# Patient Record
Sex: MALE | Race: BLACK OR AFRICAN AMERICAN | NOT HISPANIC OR LATINO | URBAN - METROPOLITAN AREA
[De-identification: names, ages, dates, MRNs, and addresses within clinical notes are randomized per-mention and may not be internally consistent; named-entity substitution may affect disease eponyms.]

---

## 2021-01-07 ENCOUNTER — WEB ENCOUNTER (OUTPATIENT)
Dept: URBAN - METROPOLITAN AREA CLINIC 115 | Facility: CLINIC | Age: 26
End: 2021-01-07

## 2021-01-07 ENCOUNTER — LAB OUTSIDE AN ENCOUNTER (OUTPATIENT)
Dept: URBAN - METROPOLITAN AREA CLINIC 115 | Facility: CLINIC | Age: 26
End: 2021-01-07

## 2021-01-07 ENCOUNTER — OFFICE VISIT (OUTPATIENT)
Dept: URBAN - METROPOLITAN AREA CLINIC 115 | Facility: CLINIC | Age: 26
End: 2021-01-07
Payer: COMMERCIAL

## 2021-01-07 DIAGNOSIS — K59.00 COLONIC CONSTIPATION: ICD-10-CM

## 2021-01-07 DIAGNOSIS — R14.3 GAS AND BLOATING: ICD-10-CM

## 2021-01-07 DIAGNOSIS — K92.1 RECTAL BLEEDING: ICD-10-CM

## 2021-01-07 DIAGNOSIS — R10.32 LEFT LOWER QUADRANT ABDOMINAL PAIN: ICD-10-CM

## 2021-01-07 PROCEDURE — 99214 OFFICE O/P EST MOD 30 MIN: CPT | Performed by: INTERNAL MEDICINE

## 2021-01-07 PROCEDURE — G9903 PT SCRN TBCO ID AS NON USER: HCPCS | Performed by: INTERNAL MEDICINE

## 2021-01-07 PROCEDURE — G8427 DOCREV CUR MEDS BY ELIG CLIN: HCPCS | Performed by: INTERNAL MEDICINE

## 2021-01-07 PROCEDURE — G8418 CALC BMI BLW LOW PARAM F/U: HCPCS | Performed by: INTERNAL MEDICINE

## 2021-01-07 PROCEDURE — 1036F TOBACCO NON-USER: CPT | Performed by: INTERNAL MEDICINE

## 2021-01-07 RX ORDER — DICYCLOMINE HYDROCHLORIDE 20 MG/2ML
1 ML INJECTION, SOLUTION INTRAMUSCULAR
Status: ACTIVE | COMMUNITY

## 2021-01-07 RX ORDER — SODIUM, POTASSIUM,MAG SULFATES 17.5-3.13G
354 ML SOLUTION, RECONSTITUTED, ORAL ORAL
Qty: 354 ML | Refills: 0 | OUTPATIENT
Start: 2021-01-07 | End: 2021-01-08

## 2021-01-07 RX ORDER — HYOSCYAMINE SULFATE 0.12 MG/1
1 TABLET AS NEEDED TABLET ORAL
Qty: 90 | Refills: 1 | OUTPATIENT
Start: 2021-01-07 | End: 2021-03-08

## 2021-01-07 NOTE — HPI-OTHER HISTORIES
04/02/2018  The patient is a 23 year old /Black male, who presents on referral from Brown Shen DO, who is requesting an evaluation for WHAT IS THE REASON FOR THE VISIT. A copy of this document will be sent to the referring provider.  The onset of the problem was 1 week ago. The stool is bright red. The blood was located on the toilet paper and in the toilet. Patient has had a few episodes episodes since the onset of symptoms. The patient does not complain of abdominal pain. The patient is taking IBUPROFEN. The patient does not complain of nausea, vomiting, or hematemesis. The patient is also experiencing lightheadedness, chest pain, and shortness of breath.  The patient has no relevant past medical or surgical history. There is not a history of cirrhosis. The patient has no significant relevant family history.  A GI diagnostic workup, prior to the initial visit, was not done.  The patient also complains of constipation. A copy of this document will be sent to the referring provider. The patient has been constipated for weeks. The constipation is mild, and His bowels move every 2 days. He denies having to use digital maneuvers to have a bowel movement. He reports having to strain with stools and denies feeling of complete evacuation. He denies the use of medications to have a bowel movement.   The patient feels like they consume adequate amounts of water. He does use a fiber supplement. The patient does not have a family history of colon cancer. The patient does not have a family history of colon polyps.   He reports no other symptoms. The patient has not a colonoscopy. The patient has not had a Sitz marker study.     ALEX HAS RECTAL INTERCOURSE, NONE SINCE FEB.   NO FOREIGN BODY OBJECT IN RECTUM  HSV WAS POSITIVE, GOING TO FOLLOW WITH CONFIRMATION TEST WITH PCP

## 2021-01-07 NOTE — HPI-TODAY'S VISIT:
01/07/2021 Patient presents for a f/u office visit for evaluation of colitis. He was in the ER for abdominal pain on 01/04/2021, had CT scan showed thickening of left colon and rectal wall. Was given dicyclomine. Flexible sigmoidoscopy on 04/19/2018 showed large hemoccult stool in the colon and hemorrhoids. March 2020, he was experiencing some gas issues. His bowel movements now are regular but sometimes occur more often than usual, normal color. He has some gas and cramping. He previously had an endoscopy and found nothing. No constipation or diarrhea. He only had some mucus in his stool for a few days months ago. No known family history of colitis. His bentyl medication helps a bit, but does not resolve the cramps and gas. His back and stomach both hurt.

## 2021-02-04 ENCOUNTER — OFFICE VISIT (OUTPATIENT)
Dept: URBAN - METROPOLITAN AREA SURGERY CENTER 13 | Facility: SURGERY CENTER | Age: 26
End: 2021-02-04
Payer: COMMERCIAL

## 2021-02-04 DIAGNOSIS — R93.3 ABN FINDINGS-GI TRACT: ICD-10-CM

## 2021-02-04 DIAGNOSIS — K51.50 CHRONIC LEFT-SIDED ULCERATIVE COLITIS: ICD-10-CM

## 2021-02-04 DIAGNOSIS — D12.5 ADENOMA OF SIGMOID COLON: ICD-10-CM

## 2021-02-04 DIAGNOSIS — K63.89 BACTERIAL OVERGROWTH SYNDROME: ICD-10-CM

## 2021-02-04 PROCEDURE — G8907 PT DOC NO EVENTS ON DISCHARG: HCPCS | Performed by: INTERNAL MEDICINE

## 2021-02-04 PROCEDURE — 45380 COLONOSCOPY AND BIOPSY: CPT | Performed by: INTERNAL MEDICINE

## 2021-06-22 ENCOUNTER — DASHBOARD ENCOUNTERS (OUTPATIENT)
Age: 26
End: 2021-06-22

## 2021-06-22 ENCOUNTER — OFFICE VISIT (OUTPATIENT)
Dept: URBAN - METROPOLITAN AREA CLINIC 82 | Facility: CLINIC | Age: 26
End: 2021-06-22
Payer: COMMERCIAL

## 2021-06-22 DIAGNOSIS — K92.1 RECTAL BLEEDING: ICD-10-CM

## 2021-06-22 DIAGNOSIS — R14.3 GAS AND BLOATING: ICD-10-CM

## 2021-06-22 DIAGNOSIS — K59.00 COLONIC CONSTIPATION: ICD-10-CM

## 2021-06-22 DIAGNOSIS — R10.32 LEFT LOWER QUADRANT ABDOMINAL PAIN: ICD-10-CM

## 2021-06-22 DIAGNOSIS — R93.89 ABNORMAL CT SCAN: ICD-10-CM

## 2021-06-22 PROBLEM — 35298007 COLONIC CONSTIPATION: Status: ACTIVE | Noted: 2021-01-07

## 2021-06-22 PROBLEM — 271832001 FLATULENCE, ERUCTATION AND GAS PAIN: Status: ACTIVE | Noted: 2021-06-22

## 2021-06-22 PROBLEM — 301716002: Status: ACTIVE | Noted: 2021-06-22

## 2021-06-22 PROBLEM — 129679001: Status: ACTIVE | Noted: 2021-01-07

## 2021-06-22 PROCEDURE — 99214 OFFICE O/P EST MOD 30 MIN: CPT | Performed by: INTERNAL MEDICINE

## 2021-06-22 PROCEDURE — G8418 CALC BMI BLW LOW PARAM F/U: HCPCS | Performed by: INTERNAL MEDICINE

## 2021-06-22 PROCEDURE — G8427 DOCREV CUR MEDS BY ELIG CLIN: HCPCS | Performed by: INTERNAL MEDICINE

## 2021-06-22 PROCEDURE — G9903 PT SCRN TBCO ID AS NON USER: HCPCS | Performed by: INTERNAL MEDICINE

## 2021-06-22 RX ORDER — DICYCLOMINE HYDROCHLORIDE 20 MG/2ML
1 ML INJECTION, SOLUTION INTRAMUSCULAR
Status: ACTIVE | COMMUNITY

## 2021-06-22 RX ORDER — DICYCLOMINE HYDROCHLORIDE 10 MG/1
1 TABLET CAPSULE ORAL THREE TIMES A DAY
Qty: 90 | Refills: 1 | OUTPATIENT
Start: 2021-06-22 | End: 2021-08-21

## 2021-06-22 NOTE — HPI-TODAY'S VISIT:
01/07/2021 Patient presents for a f/u office visit for evaluation of colitis. He was in the ER for abdominal pain on 01/04/2021, had CT scan showed thickening of left colon and rectal wall. Was given dicyclomine. Flexible sigmoidoscopy on 04/19/2018 showed large hemoccult stool in the colon and hemorrhoids. March 2020, he was experiencing some gas issues. His bowel movements now are regular but sometimes occur more often than usual, normal color. He has some gas and cramping. He previously had an endoscopy and found nothing. No constipation or diarrhea. He only had some mucus in his stool for a few days months ago. No known family history of colitis. His bentyl medication helps a bit, but does not resolve the cramps and gas. His back and stomach both hurt.  06/22/2021 Patient presents for a follow up office visit. Colonoscopy on 02/04/2021 showed polyp in the sigmoid colon, mild inflammation in the rectum. Biopsy was benign. Polyp is tubular adenoma. Patient still c/o constant gas, especially nocturnally. His bowel movements are regular with normal stools. He does notice some mucus in his stool. He has tried peppermint oil, herbal teas without improvement for gas. He does not eat later than 8pm. He did not receive a prescription for dicyclomine. Denies any family history of colitis.

## 2021-07-20 ENCOUNTER — OFFICE VISIT (OUTPATIENT)
Dept: URBAN - METROPOLITAN AREA CLINIC 82 | Facility: CLINIC | Age: 26
End: 2021-07-20

## 2021-07-20 RX ORDER — DICYCLOMINE HYDROCHLORIDE 10 MG/1
1 TABLET CAPSULE ORAL THREE TIMES A DAY
Qty: 90 | Refills: 1 | Status: ACTIVE | COMMUNITY
Start: 2021-06-22 | End: 2021-08-21

## 2021-07-20 RX ORDER — DICYCLOMINE HYDROCHLORIDE 20 MG/2ML
1 ML INJECTION, SOLUTION INTRAMUSCULAR
Status: ACTIVE | COMMUNITY